# Patient Record
Sex: MALE | Race: WHITE
[De-identification: names, ages, dates, MRNs, and addresses within clinical notes are randomized per-mention and may not be internally consistent; named-entity substitution may affect disease eponyms.]

---

## 2018-04-28 ENCOUNTER — HOSPITAL ENCOUNTER (EMERGENCY)
Dept: HOSPITAL 45 - C.EDB | Age: 48
Discharge: HOME | End: 2018-04-28
Payer: COMMERCIAL

## 2018-04-28 VITALS
WEIGHT: 171.3 LBS | BODY MASS INDEX: 23.98 KG/M2 | HEIGHT: 70.98 IN | BODY MASS INDEX: 23.98 KG/M2 | HEIGHT: 70.98 IN | WEIGHT: 171.3 LBS

## 2018-04-28 VITALS — HEART RATE: 84 BPM | DIASTOLIC BLOOD PRESSURE: 95 MMHG | OXYGEN SATURATION: 100 % | SYSTOLIC BLOOD PRESSURE: 142 MMHG

## 2018-04-28 VITALS — TEMPERATURE: 97.88 F

## 2018-04-28 DIAGNOSIS — S01.111A: Primary | ICD-10-CM

## 2018-04-28 DIAGNOSIS — Z23: ICD-10-CM
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
